# Patient Record
Sex: MALE | Race: WHITE | NOT HISPANIC OR LATINO | URBAN - METROPOLITAN AREA
[De-identification: names, ages, dates, MRNs, and addresses within clinical notes are randomized per-mention and may not be internally consistent; named-entity substitution may affect disease eponyms.]

---

## 2018-03-29 ENCOUNTER — OFFICE VISIT CONVERTED (OUTPATIENT)
Dept: ORTHOPEDIC SURGERY | Facility: CLINIC | Age: 23
End: 2018-03-29
Attending: ORTHOPAEDIC SURGERY

## 2019-09-27 ENCOUNTER — OFFICE VISIT CONVERTED (OUTPATIENT)
Dept: FAMILY MEDICINE CLINIC | Age: 24
End: 2019-09-27
Attending: NURSE PRACTITIONER

## 2019-09-30 ENCOUNTER — HOSPITAL ENCOUNTER (OUTPATIENT)
Dept: OTHER | Facility: HOSPITAL | Age: 24
Discharge: HOME | End: 2019-09-30
Attending: NURSE PRACTITIONER

## 2021-05-16 VITALS — HEIGHT: 68 IN | OXYGEN SATURATION: 97 % | WEIGHT: 232.5 LBS | BODY MASS INDEX: 35.24 KG/M2 | HEART RATE: 135 BPM

## 2021-05-18 NOTE — PROGRESS NOTES
Hadley Chiu 1995     Office/Outpatient Visit    Visit Date: Fri, Sep 27, 2019 02:02 pm    Provider: Moni Javed N.P. (Assistant: James Soler)    Location: [facility]        Electronically signed by Moni Javed N.P. on  09/30/2019 09:14:26 PM                             SUBJECTIVE:        CC:     Mr. Chiu is a 24 year old White male.  This is his first visit to the clinic.  has spot on left testicle, has just recently started having pain, but the spot has been there for years         HPI:         PHQ-9 Depression Screening: Completed form scanned and in chart; Total Score 3         Testicular pain details; this is pain in his left testicle.    The pain began one month ago.    The pain was gradual in onset.   The pain occurs several times a day.   He describes it as mild and a dull ache.   The discomfort is localized and does not radiate. No particular actions aggravate the pain. He has not found anything that relieves the discomfort.  He denies associated abdominal pain, fever, flank pain, penile discharge, penile lesions, scrotal swelling, hesitancy and urgency.  Health history is significant for negative testicular US 2 yrs ago.  mole between testicles and rectum unchanged.          tx as child.  wonders if has persisting ADD.  denies anxiety or depression.      ROS:     CONSTITUTIONAL:  Negative for chills, fatigue, fever, and weight change.      E/N/T:  Negative for hearing problems, E/N/T pain, congestion, rhinorrhea, epistaxis, hoarseness, and dental problems.      CARDIOVASCULAR:  Negative for chest pain, palpitations, tachycardia, orthopnea, and edema.      RESPIRATORY:  Negative for cough, dyspnea, and hemoptysis.      GASTROINTESTINAL:  Negative for abdominal pain, heartburn, constipation, diarrhea, and stool changes.      GENITOURINARY:  Positive for left testicle pain.   Negative for dysuria, lesions on external genitalia, hematuria, high risk sexual behavior or history  of recurrent UTIs.      MUSCULOSKELETAL:  Negative for arthralgias, back pain, and myalgias.      NEUROLOGICAL:  Negative for dizziness, headaches, paresthesias, and weakness.      ENDOCRINE:  Negative for hair loss, heat/cold intolerance, polydipsia, and polyphagia.      PSYCHIATRIC:  Positive for difficulty concentrating.   Negative for anxiety, depression, feelings of stress, personality change, recreational drug use, sleep disturbance or suicidal thoughts.          PMH/FMH/SH:     Last Reviewed on 9/27/2019 02:26 PM by Moni Javed    Past Medical History:             PAST MEDICAL HISTORY         Chicken pox: dx'd at age 1;     Fracture(s): left fibula 2018;     ADD         Surgical History:     NONE         Family History:     Unremarkable         Social History:         Marital Status:      Children: 1 child         Tobacco/Alcohol/Supplements:     Tobacco: He has never smoked.              Current Problems:     High risk sexual behavior     Intermittent explosive disorder     Acne         Immunizations:     FluMist 11/7/2006     FluMist 10/13/2008     Menactra (Meningococcal MCV4P) 2/28/2008         Allergies:       No Known Drug Allergies.         Current Medications:     None        OBJECTIVE:        Vitals:         Historical:     09/15/2009  BP:   112/74 mm Hg ( (left arm, , sitting, );)     07/14/2009  Wt:   157.5lbs        Current: 9/27/2019 2:09:10 PM    Ht:  5 ft, 5 in;  Wt: 238.8 lbs;  BMI: 39.7    T: 97.8 F (oral);  BP: 133/93 mm Hg (right arm, sitting);  P: 90 bpm (right arm (BP Cuff), sitting);  sCr: 0.6 mg/dL;  GFR: 191.51        Repeat:     2:11:08 PM     BP:   140/89mm Hg (right arm, sitting)     2:11:16 PM     P:   84bpm (right arm (BP Cuff), sitting)         Exams:     PHYSICAL EXAM:     GENERAL: no apparent distress;     RESPIRATORY: normal respiratory rate and pattern with no distress; normal breath sounds with no rales, rhonchi, wheezes or rubs;     CARDIOVASCULAR: normal rate;  rhythm is regular;     GASTROINTESTINAL: nontender; normal bowel sounds; no organomegaly;     GENITOURINARY: declines exam;     MUSCULOSKELETAL:  Normal range of motion, strength and tone;     NEUROLOGIC: mental status: alert and oriented x 3; GROSSLY INTACT     PSYCHIATRIC:  appropriate affect and demeanor; normal speech pattern; grossly normal memory;         Lab/Test Results:             Glucose, Urine:  Neg (09/27/2019),     Bilirubin, urine:  Negative (09/27/2019),     Ketones, Urine Strip:  Negative (09/27/2019),     Specific Gravity, urine:  1.025 (09/27/2019),     Blood in Urine:  negative (09/27/2019),     pH, urine:  7.0 (09/27/2019),     Protein Urine QL:  negative (09/27/2019),     Urobilinogen, urine:  1.0 E.U./dL (09/27/2019),     Nitrite, Urine:  Negative (09/27/2019),     Leukoctyes, urine:  Negative (09/27/2019),     Appearance:  Clear (09/27/2019),     collection source:  Clean-catch (09/27/2019),     Color:  Yellow (09/27/2019),     Performed by::  des (09/27/2019),             ASSESSMENT           V79.0   Z13.31  Screening for depression              DDx:     608.89   N50.819  Testicular pain              DDx:     799.51   R41.840  Attention or concentration deficit              DDx:         ORDERS:         Meds Prescribed:       Doxycycline Monohydrate (Doxycycline Monohydrate)  100mg Capsules Take 1 capsule twice daily.  #14 (Fourteen) capsule(s) Refills: 0         Radiology/Test Orders:       [id number]  Ultrasound, scrotum and contents  (Send-Out)           Lab Orders:       [id number]  Urinalysis, automated, without microscopy  (In-House)           Procedures Ordered:       REFER  Referral to Specialist or Other Facility  (Send-Out)           Other Orders:         Depression screen negative  (In-House)                   PLAN:          Screening for depression     MIPS PHQ-9 Depression Screening: Completed form scanned and in chart; Total Score 3; Negative Depression Screen           Orders:          Depression screen negative  (In-House)            Testicular pain         RADIOLOGY:  I have ordered Testicular Ultrasound to be done today.      RECOMMENDATIONS given include: elevation of the scrotum, use of a scrotal  supporter, and UA without concerns.  declines urine for gc / chlamydia.  cover for possible epididymitis.  to ER if worsens..            Prescriptions:       Doxycycline Monohydrate (Doxycycline Monohydrate)  100mg Capsules Take 1 capsule twice daily.  #14 (Fourteen) capsule(s) Refills: 0           Orders:       [id number]  Urinalysis, automated, without microscopy  (In-House)         [id number]  Ultrasound, scrotum and contents  (Send-Out)             Patient Education Handouts:       Epididymitis           Attention or concentration deficit         REFERRALS:  Referral initiated to a psychiatrist ( maria elena ).      RECOMMENDATIONS given include: need specialist evaluation as below..            Orders:       REFER  Referral to Specialist or Other Facility  (Send-Out)               Patient Recommendations:        For  Testicular pain:     Elevate the scrotum with a towel. Wear a scrotal supporter.              CHARGE CAPTURE           **Please note: ICD descriptions below are intended for billing purposes only and may not represent clinical diagnoses**        Primary Diagnosis:         V79.0 Screening for depression            Z13.31    Encounter for screening for depression              Orders:          [id number]   Office visit - new pt, level 2  (In-House)                Depression screen negative  (In-House)           608.89 Testicular pain            N50.819    Testicular pain, unspecified              Orders:          [id number]   Urinalysis, automated, without microscopy  (In-House)           799.51 Attention or concentration deficit            R41.840    Attention and concentration deficit        ADDENDUMS:      ____________________________________    Addendum: 10/08/2019 02:16 PM - Moni Javed         remove [id number]    add [id number]. lj

## 2021-07-01 VITALS
DIASTOLIC BLOOD PRESSURE: 89 MMHG | TEMPERATURE: 97.8 F | HEART RATE: 84 BPM | WEIGHT: 238.8 LBS | SYSTOLIC BLOOD PRESSURE: 140 MMHG | BODY MASS INDEX: 39.79 KG/M2 | HEIGHT: 65 IN

## 2022-03-10 ENCOUNTER — TRANSCRIBE ORDERS (OUTPATIENT)
Dept: ADMINISTRATIVE | Facility: HOSPITAL | Age: 27
End: 2022-03-10

## 2022-03-10 DIAGNOSIS — N50.89 TESTICLE LUMP: Primary | ICD-10-CM
